# Patient Record
Sex: MALE | Race: WHITE
[De-identification: names, ages, dates, MRNs, and addresses within clinical notes are randomized per-mention and may not be internally consistent; named-entity substitution may affect disease eponyms.]

---

## 2019-04-07 ENCOUNTER — HOSPITAL ENCOUNTER (EMERGENCY)
Dept: HOSPITAL 8 - ED | Age: 69
Discharge: HOME | End: 2019-04-07
Payer: MEDICARE

## 2019-04-07 VITALS — DIASTOLIC BLOOD PRESSURE: 78 MMHG | SYSTOLIC BLOOD PRESSURE: 144 MMHG

## 2019-04-07 VITALS — HEIGHT: 70 IN | BODY MASS INDEX: 38.66 KG/M2 | WEIGHT: 270.07 LBS
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
## 2019-04-07 DIAGNOSIS — M25.512: ICD-10-CM
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
## 2019-04-07 DIAGNOSIS — S16.1XXA: Primary | ICD-10-CM
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
## 2019-04-07 DIAGNOSIS — I10: ICD-10-CM
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
## 2019-04-07 PROCEDURE — 72125 CT NECK SPINE W/O DYE: CPT

## 2019-04-07 PROCEDURE — 99284 EMERGENCY DEPT VISIT MOD MDM: CPT
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
